# Patient Record
Sex: FEMALE | Race: OTHER
[De-identification: names, ages, dates, MRNs, and addresses within clinical notes are randomized per-mention and may not be internally consistent; named-entity substitution may affect disease eponyms.]

---

## 2019-04-05 ENCOUNTER — HOSPITAL ENCOUNTER (EMERGENCY)
Dept: HOSPITAL 77 - KA.ED | Age: 49
Discharge: HOME | End: 2019-04-05
Payer: SELF-PAY

## 2019-04-05 DIAGNOSIS — F17.210: ICD-10-CM

## 2019-04-05 DIAGNOSIS — Z88.5: ICD-10-CM

## 2019-04-05 DIAGNOSIS — Z88.0: ICD-10-CM

## 2019-04-05 DIAGNOSIS — Z79.899: ICD-10-CM

## 2019-04-05 DIAGNOSIS — J01.90: Primary | ICD-10-CM

## 2019-04-05 NOTE — EDM.PDOC
ED HPI GENERAL MEDICAL PROBLEM





- General


Chief Complaint: ENT Problem


Stated Complaint: SINUS


Time Seen by Provider: 04/05/19 17:55


Source of Information: Reports: Patient ([id number])


History Limitations: Reports: No Limitations





- History of Present Illness


INITIAL COMMENTS - FREE TEXT/NARRATIVE: 





49-year-old female presents emergency room with complaints of sinus congestion, 

and cough over the last 3 weeks. Over the lasted 24 hours she reports that her 

ears now feel full. She is been taken Mucinex and nasal decongestant. She 

denies any fever or chills. She's been coughing up some sputum she states in 

the last couple days. She denies sore throat. She has had no chest pain or 

shortness of breath. No nausea or vomiting. She does not have a local primary 

care which I've encouraged her and given her medical providers in the area that 

would be happy to see her in clinic in the future. She is currently residing in 

[place] and runs the restaurant bar.


Onset: Gradual


Duration: Constant


Location: Reports: Face


Quality: Reports: Ache, Pressure


Improves with: Reports: Medication


Worsens with: Reports: None


Associated Symptoms: Denies: Fever/Chills, Headaches, Nausea/Vomiting, Rash, 

Shortness of Breath


Treatments PTA: Reports: Other Medication(s) (Mucinex, guaifenesin)


  ** Headache


Pain Score (Numeric/FACES): 5





- Related Data


 Allergies











Allergy/AdvReac Type Severity Reaction Status Date / Time


 


acetaminophen [From Percocet] Allergy  Hives Verified 04/05/19 17:40


 


oxycodone [From Percocet] Allergy  Hives Verified 04/05/19 17:40


 


Penicillins Allergy  Vomiting Verified 04/05/19 17:39











Home Meds: 


 Home Meds





D-Methorphan/Acetamin/Doxylamn [Night Time Cold & Flu Liq] 1 capful PO BEDTIME 

PRN 04/05/19 [History]


D-Methorphan/PE/Acetaminophen [Day Time Cold-Flu Liquid] 1 capful PO DAILY PRN 

04/05/19 [History]


guaiFENesin [Mucinex] 600 mg PO BID PRN 04/05/19 [History]











Social & Family History





- Tobacco Use


Smoking Status *Q: Current Every Day Smoker


Years of Tobacco use: 30


Packs/Tins Daily: 0.5


Used Tobacco, but Quit: No


Second Hand Smoke Exposure: No





ED ROS ENT





- Review of Systems


Review Of Systems: See Below


Constitutional: Denies: Fever, Chills


HEENT: Reports: Ear Pain (Ear fullness bilaterally), Glasses, Rhinitis, Sinus 

Problem.  Denies: Dental Pain, Ear Discharge, Eye Pain, Throat Pain, Throat 

Swelling, Vertigo


Respiratory: Reports: Cough, Sputum.  Denies: Shortness of Breath, Wheezing, 

Pleuritic Chest Pain


Cardiovascular: Denies: Chest Pain, Lightheadedness


Endocrine: Reports: No Symptoms


GI/Abdominal: Reports: No Symptoms


: Reports: No Symptoms


Musculoskeletal: Reports: No Symptoms


Skin: Reports: No Symptoms


Neurological: Reports: Dizziness.  Denies: Change in Speech, Gait Disturbance


Psychiatric: Reports: No Symptoms


Hematologic/Lymphatic: Reports: No Symptoms


Immunologic: Reports: No Symptoms





ED EXAM, ENT





- Physical Exam


Exam: See Below


Exam Limited By: No Limitations


General Appearance: Alert, WD/WN, No Apparent Distress


Eye Exam: Bilateral Eye: EOMI, PERRL


Ears: Normal External Exam, Normal Canal, Hearing Grossly Normal, TM Dullness.  

No: TM Bulging, TM Erythema


Nose: Normal Inspection, Normal Mucousa, No Blood


Mouth/Throat: Normal Inspection, Normal Gums, Normal Lips, Normal Oropharynx, 

Normal Teeth


Head: Atraumatic, Normocephalic


Neck: Normal Inspection, Supple, Non-Tender, Full Range of Motion.  No: 

Lymphadenopathy (L), Lymphadenopathy (R)


Respiratory/Chest: No Respiratory Distress, Lungs Clear, Normal Breath Sounds, 

No Accessory Muscle Use, Chest Non-Tender


Cardiovascular: Normal Peripheral Pulses, Regular Rate, Rhythm, No Murmur


GI/Abdominal: Soft, Non-Tender


Back: Normal Inspection


Extremities: Normal Inspection


Neurological: Alert, Oriented, Normal Cognition, Normal Gait, No Motor/Sensory 

Deficits


Psychiatric: Normal Affect, Normal Mood


Skin: Warm, Dry, Intact, Normal Color, No Rash


Lymphatic: No Adenopathy





Course





- Vital Signs


Last Recorded V/S: 





 Last Vital Signs











Temp  97.2 F   04/05/19 18:00


 


Pulse  68   04/05/19 18:00


 


Resp  20   04/05/19 18:00


 


BP  105/65   04/05/19 18:00


 


Pulse Ox  95   04/05/19 18:00














- Orders/Labs/Meds


Meds: 





Medications














Discontinued Medications














Generic Name Dose Route Start Last Admin





  Trade Name Freq  PRN Reason Stop Dose Admin


 


Azithromycin  500 mg  04/05/19 18:27  04/05/19 18:35





  Zithromax  PO  04/05/19 18:28  500 mg





  ONETIME ONE   Administration





     





     





     





     














Departure





- Departure


Time of Disposition: 18:44


Disposition: Home, Self-Care 01


Condition: Good


Clinical Impression: 


Sinusitis


Qualifiers:


 Sinusitis location: unspecified location Chronicity: acute Recurrence: non-

recurrent Qualified Code(s): J01.90 - Acute sinusitis, unspecified








- Discharge Information


Instructions:  Sinusitis, Adult, Easy-to-Read, Earache, Adult


Referrals: 


PCP,None [Primary Care Provider] - 


Forms:  ED Department Discharge


Additional Instructions: 


1. Z-Rafael take as directed


2. Prednisone taper take as directed.


3. Rest continue with oral hydration.


4. You need to find a primary care for your routine checkups. I have 

recommended Dr. Lottie Abdul or Elida BENDER in [place], [place].





- Assessment/Plan


Assessment:: 





Sinusitis


Plan: 





1. Z-Rafael take as directed


2. Prednisone taper take as directed.


3. Rest continue with oral hydration.


4. You need to find a primary care for your routine checkups. I have 

recommended Dr. Lottie Abdul or Elida BENDER in [place], [place].